# Patient Record
Sex: FEMALE | Race: ASIAN | Employment: FULL TIME | URBAN - METROPOLITAN AREA
[De-identification: names, ages, dates, MRNs, and addresses within clinical notes are randomized per-mention and may not be internally consistent; named-entity substitution may affect disease eponyms.]

---

## 2017-03-09 ENCOUNTER — OFFICE VISIT (OUTPATIENT)
Dept: INTERNAL MEDICINE CLINIC | Age: 42
End: 2017-03-09

## 2017-03-09 VITALS
TEMPERATURE: 98.7 F | HEART RATE: 72 BPM | RESPIRATION RATE: 18 BRPM | BODY MASS INDEX: 24.83 KG/M2 | SYSTOLIC BLOOD PRESSURE: 104 MMHG | HEIGHT: 65 IN | WEIGHT: 149 LBS | DIASTOLIC BLOOD PRESSURE: 78 MMHG

## 2017-03-09 DIAGNOSIS — B96.89 SINUSITIS, BACTERIAL: ICD-10-CM

## 2017-03-09 DIAGNOSIS — Z00.00 ANNUAL PHYSICAL EXAM: Primary | ICD-10-CM

## 2017-03-09 DIAGNOSIS — J32.9 SINUSITIS, BACTERIAL: ICD-10-CM

## 2017-03-09 PROCEDURE — 99213 OFFICE O/P EST LOW 20 MIN: CPT | Performed by: INTERNAL MEDICINE

## 2017-03-09 PROCEDURE — 99396 PREV VISIT EST AGE 40-64: CPT | Performed by: INTERNAL MEDICINE

## 2017-03-09 RX ORDER — LEVONORGESTREL AND ETHINYL ESTRADIOL 6-5-10
KIT ORAL
COMMUNITY
Start: 2017-01-20

## 2017-03-09 RX ORDER — AMOXICILLIN AND CLAVULANATE POTASSIUM 875; 125 MG/1; MG/1
1 TABLET, FILM COATED ORAL 2 TIMES DAILY
Qty: 20 TABLET | Refills: 0 | Status: SHIPPED | OUTPATIENT
Start: 2017-03-09 | End: 2017-03-19

## 2018-10-27 ENCOUNTER — HOSPITAL ENCOUNTER (OUTPATIENT)
Dept: MAMMOGRAPHY | Age: 43
Discharge: HOME | End: 2018-11-01
Payer: COMMERCIAL

## 2018-10-27 DIAGNOSIS — Z12.31 VISIT FOR SCREENING MAMMOGRAM: ICD-10-CM

## 2018-10-27 PROCEDURE — 77067 SCR MAMMO BI INCL CAD: CPT

## 2019-03-15 ENCOUNTER — OFFICE VISIT (OUTPATIENT)
Dept: FAMILY MEDICINE CLINIC | Age: 44
End: 2019-03-15
Payer: COMMERCIAL

## 2019-03-15 VITALS
OXYGEN SATURATION: 99 % | BODY MASS INDEX: 25.96 KG/M2 | RESPIRATION RATE: 16 BRPM | HEART RATE: 77 BPM | SYSTOLIC BLOOD PRESSURE: 100 MMHG | TEMPERATURE: 98.5 F | DIASTOLIC BLOOD PRESSURE: 76 MMHG | WEIGHT: 155.8 LBS | HEIGHT: 65 IN

## 2019-03-15 DIAGNOSIS — J01.90 ACUTE BACTERIAL SINUSITIS: Primary | ICD-10-CM

## 2019-03-15 DIAGNOSIS — B96.89 ACUTE BACTERIAL SINUSITIS: Primary | ICD-10-CM

## 2019-03-15 PROCEDURE — 99213 OFFICE O/P EST LOW 20 MIN: CPT | Performed by: NURSE PRACTITIONER

## 2019-03-15 RX ORDER — AMOXICILLIN AND CLAVULANATE POTASSIUM 875; 125 MG/1; MG/1
1 TABLET, FILM COATED ORAL 2 TIMES DAILY
Qty: 20 TABLET | Refills: 0 | Status: SHIPPED | OUTPATIENT
Start: 2019-03-15 | End: 2019-03-25

## 2019-03-15 RX ORDER — METHYLPREDNISOLONE 4 MG/1
TABLET ORAL
Qty: 1 KIT | Refills: 0 | Status: SHIPPED | OUTPATIENT
Start: 2019-03-15 | End: 2019-03-21

## 2019-03-15 ASSESSMENT — PATIENT HEALTH QUESTIONNAIRE - PHQ9
2. FEELING DOWN, DEPRESSED OR HOPELESS: 0
SUM OF ALL RESPONSES TO PHQ9 QUESTIONS 1 & 2: 0
SUM OF ALL RESPONSES TO PHQ QUESTIONS 1-9: 0
1. LITTLE INTEREST OR PLEASURE IN DOING THINGS: 0
SUM OF ALL RESPONSES TO PHQ QUESTIONS 1-9: 0

## 2019-03-15 ASSESSMENT — ENCOUNTER SYMPTOMS
SHORTNESS OF BREATH: 0
SINUS PRESSURE: 1
COUGH: 1
SINUS PAIN: 1
GASTROINTESTINAL NEGATIVE: 1
WHEEZING: 0
SORE THROAT: 1

## 2019-05-01 ENCOUNTER — TELEPHONE (OUTPATIENT)
Dept: INTERNAL MEDICINE CLINIC | Age: 44
End: 2019-05-01

## 2019-11-26 ENCOUNTER — TELEPHONE (OUTPATIENT)
Dept: INTERNAL MEDICINE CLINIC | Age: 44
End: 2019-11-26

## 2019-12-24 ENCOUNTER — TELEPHONE (OUTPATIENT)
Dept: INTERNAL MEDICINE CLINIC | Age: 44
End: 2019-12-24

## 2020-04-28 ENCOUNTER — VIRTUAL VISIT (OUTPATIENT)
Dept: INTERNAL MEDICINE CLINIC | Age: 45
End: 2020-04-28
Payer: COMMERCIAL

## 2020-04-28 VITALS — BODY MASS INDEX: 26.66 KG/M2 | HEIGHT: 65 IN | WEIGHT: 160 LBS

## 2020-04-28 PROCEDURE — 99203 OFFICE O/P NEW LOW 30 MIN: CPT | Performed by: INTERNAL MEDICINE

## 2020-04-28 ASSESSMENT — PATIENT HEALTH QUESTIONNAIRE - PHQ9
SUM OF ALL RESPONSES TO PHQ9 QUESTIONS 1 & 2: 0
1. LITTLE INTEREST OR PLEASURE IN DOING THINGS: 0
SUM OF ALL RESPONSES TO PHQ QUESTIONS 1-9: 0
2. FEELING DOWN, DEPRESSED OR HOPELESS: 0
SUM OF ALL RESPONSES TO PHQ QUESTIONS 1-9: 0

## 2020-06-01 ENCOUNTER — OFFICE VISIT (OUTPATIENT)
Dept: INTERNAL MEDICINE CLINIC | Age: 45
End: 2020-06-01
Payer: COMMERCIAL

## 2020-06-01 VITALS
HEIGHT: 65 IN | SYSTOLIC BLOOD PRESSURE: 108 MMHG | TEMPERATURE: 98.3 F | HEART RATE: 76 BPM | OXYGEN SATURATION: 98 % | DIASTOLIC BLOOD PRESSURE: 74 MMHG | WEIGHT: 164 LBS | BODY MASS INDEX: 27.32 KG/M2

## 2020-06-01 LAB
A/G RATIO: 1.8 (ref 1.1–2.2)
ALBUMIN SERPL-MCNC: 4.5 G/DL (ref 3.4–5)
ALP BLD-CCNC: 53 U/L (ref 40–129)
ALT SERPL-CCNC: 29 U/L (ref 10–40)
ANION GAP SERPL CALCULATED.3IONS-SCNC: 15 MMOL/L (ref 3–16)
AST SERPL-CCNC: 26 U/L (ref 15–37)
BASOPHILS ABSOLUTE: 0.1 K/UL (ref 0–0.2)
BASOPHILS RELATIVE PERCENT: 0.9 %
BILIRUB SERPL-MCNC: 0.6 MG/DL (ref 0–1)
BUN BLDV-MCNC: 11 MG/DL (ref 7–20)
CALCIUM SERPL-MCNC: 9.1 MG/DL (ref 8.3–10.6)
CHLORIDE BLD-SCNC: 96 MMOL/L (ref 99–110)
CHOLESTEROL, TOTAL: 164 MG/DL (ref 0–199)
CO2: 23 MMOL/L (ref 21–32)
CREAT SERPL-MCNC: 0.7 MG/DL (ref 0.6–1.1)
EOSINOPHILS ABSOLUTE: 0.3 K/UL (ref 0–0.6)
EOSINOPHILS RELATIVE PERCENT: 5.3 %
GFR AFRICAN AMERICAN: >60
GFR NON-AFRICAN AMERICAN: >60
GLOBULIN: 2.5 G/DL
GLUCOSE BLD-MCNC: 108 MG/DL (ref 70–99)
HCT VFR BLD CALC: 43 % (ref 36–48)
HDLC SERPL-MCNC: 41 MG/DL (ref 40–60)
HEMOGLOBIN: 14.6 G/DL (ref 12–16)
LDL CHOLESTEROL CALCULATED: 86 MG/DL
LYMPHOCYTES ABSOLUTE: 2.1 K/UL (ref 1–5.1)
LYMPHOCYTES RELATIVE PERCENT: 33.6 %
MCH RBC QN AUTO: 31.1 PG (ref 26–34)
MCHC RBC AUTO-ENTMCNC: 34 G/DL (ref 31–36)
MCV RBC AUTO: 91.4 FL (ref 80–100)
MONOCYTES ABSOLUTE: 0.4 K/UL (ref 0–1.3)
MONOCYTES RELATIVE PERCENT: 6.2 %
NEUTROPHILS ABSOLUTE: 3.3 K/UL (ref 1.7–7.7)
NEUTROPHILS RELATIVE PERCENT: 54 %
PDW BLD-RTO: 12.9 % (ref 12.4–15.4)
PLATELET # BLD: 326 K/UL (ref 135–450)
PMV BLD AUTO: 6.7 FL (ref 5–10.5)
POTASSIUM SERPL-SCNC: 4.4 MMOL/L (ref 3.5–5.1)
RBC # BLD: 4.71 M/UL (ref 4–5.2)
SODIUM BLD-SCNC: 134 MMOL/L (ref 136–145)
TOTAL PROTEIN: 7 G/DL (ref 6.4–8.2)
TRIGL SERPL-MCNC: 186 MG/DL (ref 0–150)
VLDLC SERPL CALC-MCNC: 37 MG/DL
WBC # BLD: 6.1 K/UL (ref 4–11)

## 2020-06-01 PROCEDURE — 90471 IMMUNIZATION ADMIN: CPT | Performed by: INTERNAL MEDICINE

## 2020-06-01 PROCEDURE — 99396 PREV VISIT EST AGE 40-64: CPT | Performed by: INTERNAL MEDICINE

## 2020-06-01 PROCEDURE — 90715 TDAP VACCINE 7 YRS/> IM: CPT | Performed by: INTERNAL MEDICINE

## 2020-06-01 PROCEDURE — 36415 COLL VENOUS BLD VENIPUNCTURE: CPT | Performed by: INTERNAL MEDICINE

## 2020-07-07 ENCOUNTER — HOSPITAL ENCOUNTER (OUTPATIENT)
Dept: PHYSICAL THERAPY | Age: 45
Setting detail: THERAPIES SERIES
Discharge: HOME | End: 2020-07-07
Payer: COMMERCIAL

## 2020-07-07 PROCEDURE — 97112 NEUROMUSCULAR REEDUCATION: CPT

## 2020-07-07 PROCEDURE — 97161 PT EVAL LOW COMPLEX 20 MIN: CPT

## 2020-07-07 PROCEDURE — 97110 THERAPEUTIC EXERCISES: CPT

## 2020-07-07 NOTE — FLOWSHEET NOTE
[address] [facility], [address] [place] [address], [address] [place] [address] [address]  Phone: [phone]   Fax:     [phone]      Physical Therapy Treatment Note/ Progress Report:           Date:  2020    Patient Name:  Steph Rodriguez    :  1975  MRN: [id number]  Restrictions/Precautions:    Medical/Treatment Diagnosis Information:  · Diagnosis: U70.487Y (ICD-10-CM) - Sprain of right ankle, unspecified ligament, sequela  · Treatment Diagnosis: Right ankle sprain,   Insurance/Certification information:  PT Insurance Information: [facility]  Physician Information:  Referring Practitioner: Marquis Robbins MD  Has the plan of care been signed (Y/N):        []  Yes  []  No     Date of Patient follow up with Physician:     Assessment Summary: Glenis Espinosa is a 54 y.o. female reporting to OP PT with c/c of right ankle pain which has been occurring since early March when falling down steps which caused inversion sprain. Pt is noted to have relatively good ROM and strength. There is no laxity noted at ankle but she does have some pain at ATFL when performing an anterior drawer test. Likely ATFL sprain and peroneal tendon strain.        Is this a Progress Report:     []  Yes  [x]  No        If Yes:  Date Range for reporting period:  Beginning 20  Ending     Progress report will be due (10 Rx or 30 days whichever is less): 82       Recertification will be due (POC Duration  / 90 days whichever is less): 20         Visit # Insurance Allowable Auth Required   1 20 May need auth [x]  Yes []  No        Functional Scale: LEFS 18%    Date assessed:        Latex Allergy:  [x]NO      []YES  Preferred Language for Healthcare:   [x]English       []other:      Pain level:  0-4/10     SUBJECTIVE:  see eval    OBJECTIVE: see eval      RESTRICTIONS/PRECAUTIONS: Avoid end range inversion and plantarflexion to avoid repeated ATFL and peroneal stress    Exercises/Interventions: hip and LE for self-care, mobility, lifting, and ambulation/stair navigation      Manual Treatments:  PROM / STM / Oscillations-Mobs:  G-I, II, III, IV (PA's, Inf., Post.)  [] (45689) Provided manual therapy to mobilize LE, proximal hip and/or LS spine soft tissue/joints for the purpose of modulating pain, promoting relaxation, increasing ROM, reducing/eliminating soft tissue swelling/inflammation/restriction, improving soft tissue extensibility and allowing for proper ROM for normal function with self-care, mobility, lifting and ambulation. Modalities:     [] GAME READY (VASO)- for significant edema, swelling, pain control. Charges:  Timed Code Treatment Minutes: 23   Total Treatment Minutes: 45      [x] EVAL (LOW) 71381 (typically 20 minutes face-to-face)  [] EVAL (MOD) 62064 (typically 30 minutes face-to-face)  [] EVAL (HIGH) 31206 (typically 45 minutes face-to-face)  [] RE-EVAL     [x] SB(68083) x     [] IONTO  [x] NMR (91486) x     [] VASO  [] Manual (69376) x     [] Other:  [] TA x      [] Mech Traction (33812)  [] ES(attended) (57054)      [] ES (un) (43027):    GOALS:    Patient stated goal: strengthen ankle, no pain after exercises.      Therapist goals for Patient:   Short Term Goals: To be achieved in: 2 weeks  1. Independent in HEP and progression per patient tolerance, in order to prevent re-injury. []? Progressing: []? Met: []? Not Met: []? Adjusted      2. Patient will have a decrease in pain to facilitate improvement in movement, function, and ADLs as indicated by Functional Deficits. []? Progressing: []? Met: []? Not Met: []? Adjusted      Long Term Goals: To be achieved in: 6 weeks  1. Disability index score of 0% or less for the LEFS to assist with reaching prior level of function. []? Progressing: []? Met: []? Not Met: []? Adjusted      2. Patient will demonstrate increased AROM to 12-50 to allow for proper joint functioning as indicated by patients Functional Deficits.    []? Progressing: []? Met: []? Not Met: []? Adjusted      3. Patient will be able to resume Roman classes. []? Progressing: []? Met: []? Not Met: []? Adjusted      4. Patient will return to functional activities without increased symptoms or restriction. []? Progressing: []? Met: []? Not Met: []? Adjusted      5. Pt will be able to run with dog without symptoms. (patient specific functional goal)    []? Progressing: []? Met: []? Not Met: []? Adjusted               ASSESSMENT:  See eval    Patient received education on their current pathology and how their condition effects them with their functional activities. Patient understood discussion and questions were answered. Patient understands their activity limitations and understands rational for treatment progression. Pt educated on plan of care and HEP, if worsening symptoms to d/c that exercise. Overall Progression Towards Functional goals/ Treatment Progress Update:  [] Patient is progressing as expected towards functional goals listed. [] Progression is slowed due to complexities/Impairments listed. [] Progression has been slowed due to co-morbidities.   [x] Plan just implemented, too soon to assess goals progression <30days   [] Goals require adjustment due to lack of progress  [] Patient is not progressing as expected and requires additional follow up with physician  [] Other    Prognosis for POC: [x] Good [] Fair  [] Poor      Patient requires continued skilled intervention: [x] Yes  [] No    Treatment/Activity Tolerance:  [x] Patient able to complete treatment  [] Patient limited by fatigue  [] Patient limited by pain    [] Patient limited by other medical complications  [] Other:         Return to Play: (if applicable)   []  Stage 1: Intro to Strength   []  Stage 2: Return to Run and Strength   []  Stage 3: Return to Jump and Strength   []  Stage 4: Dynamic Strength and Agility   []  Stage 5: Sport Specific Training     []  Ready to Return to Play,

## 2020-07-07 NOTE — PLAN OF CARE
[]Constant   [x]Intermittent  []Radiating []Localized []other:     Numbness/Tingling: None    Occupation/School: , teacher    Living Status/Prior Level of Function: Independent with ADLs and IADLs. OBJECTIVE:     ROM RIGHT LEFT        Ankle Plantarflexion 40 50   Ankle Dorsiflexion 8 12   Ankle Inversion 30 30   Ankle Eversion 28 20        Popliteal angle     Rectus femoris          Strength  RIGHT LEFT             Ankle DF 5    Ankle PF 4+    Ankle Inv 5    Ankle EV 5           Reflexes/Sensation:    [x]Dermatomes/Myotomes intact    [x]Reflexes equal and normal bilaterally   []Other:    Joint mobility:    []Normal    [x]Hypo   []Hyper    Palpation: ttp ATFL, peroneal tendons; there no is navicular or base of the 5th tenderness. Is is no edema. Functional Mobility/Transfers: Independent    Posture: WFL    Bandages/Dressings/Incisions: None    Gait: (include devices/WB status) WNL    Orthopedic Special Tests: painful anterior drawer- no laxity noted compared to contralateral side. [x] Patient history, allergies, meds reviewed. Medical chart reviewed. See intake form. Review Of Systems (ROS):  [x]Performed Review of systems (Integumentary, CardioPulmonary, Neurological) by intake and observation. Intake form has been scanned into medical record. Patient has been instructed to contact their primary care physician regarding ROS issues if not already being addressed at this time.       Co-morbidities/Complexities (which will affect course of rehabilitation):   [x]None           Arthritic conditions   []Rheumatoid arthritis (M05.9)  []Osteoarthritis (M19.91)   Cardiovascular conditions   []Hypertension (I10)  []Hyperlipidemia (E78.5)  []Angina pectoris (I20)  []Atherosclerosis (I70)   Musculoskeletal conditions   []Disc pathology   []Congenital spine pathologies   []Prior surgical intervention  []Osteoporosis (M81.8)  []Osteopenia (M85.8)   Endocrine conditions standardized tests and measures addressing any of the following: body structures and functions (impairments), activity limitations, and/or participation restrictions;:  [x] a total of 1-2 or more elements   [] a total of 3 or more elements   [] a total of 4 or more elements   [x] A clinical presentation with:  [x] stable and/or uncomplicated characteristics   [] evolving clinical presentation with changing characteristics  [] unstable and unpredictable characteristics;   [x] Clinical decision making of [] low, [] moderate, [] high complexity using standardized patient assessment instrument and/or measurable assessment of functional outcome. [x] EVAL (LOW) 15051 (typically 20 minutes face-to-face)  [] EVAL (MOD) 85986 (typically 30 minutes face-to-face)  [] EVAL (HIGH) 04920 (typically 45 minutes face-to-face)  [] RE-EVAL     PLAN:   Frequency/Duration:  2 days per week for 6 Weeks:  Interventions:  [x]  Therapeutic exercise including: strength training, ROM, for Lower extremity and core   [x]  NMR activation and proprioception for LE, Glutes and Core   [x]  Manual therapy as indicated for LE, Hip and spine to include: Dry Needling/IASTM, STM, PROM, Gr I-IV mobilizations, manipulation. [x] Modalities as needed that may include: thermal agents, E-stim, Biofeedback, US, iontophoresis as indicated  [x] Patient education on joint protection, postural re-education, activity modification, progression of HEP. HEP instruction: (see scanned forms)    GOALS:  Patient stated goal: strengthen ankle, no pain after exercises. Therapist goals for Patient:   Short Term Goals: To be achieved in: 2 weeks  1. Independent in HEP and progression per patient tolerance, in order to prevent re-injury. [] Progressing: [] Met: [] Not Met: [] Adjusted     2. Patient will have a decrease in pain to facilitate improvement in movement, function, and ADLs as indicated by Functional Deficits.   [] Progressing: [] Met: [] Not Met: [] Adjusted     Long Term Goals: To be achieved in: 6 weeks  1. Disability index score of 0% or less for the LEFS to assist with reaching prior level of function. [] Progressing: [] Met: [] Not Met: [] Adjusted     2. Patient will demonstrate increased AROM to 12-50 to allow for proper joint functioning as indicated by patients Functional Deficits. [] Progressing: [] Met: [] Not Met: [] Adjusted     3. Patient will be able to resume Roman classes. [] Progressing: [] Met: [] Not Met: [] Adjusted     4. Patient will return to functional activities without increased symptoms or restriction. [] Progressing: [] Met: [] Not Met: [] Adjusted     5.  Pt will be able to run with dog without symptoms. (patient specific functional goal)    [] Progressing: [] Met: [] Not Met: [] Adjusted      Electronically signed by:  Barbara Ryan, PT

## 2020-07-14 ENCOUNTER — HOSPITAL ENCOUNTER (OUTPATIENT)
Dept: PHYSICAL THERAPY | Age: 45
Setting detail: THERAPIES SERIES
Discharge: HOME | End: 2020-07-14
Payer: COMMERCIAL

## 2020-07-14 PROCEDURE — 97112 NEUROMUSCULAR REEDUCATION: CPT

## 2020-07-14 PROCEDURE — 97110 THERAPEUTIC EXERCISES: CPT

## 2020-07-14 NOTE — FLOWSHEET NOTE
[address] [facility], [address] [place] [address], [address] [place] [address] [address]  Phone: [phone]   Fax:     [phone]      Physical Therapy Treatment Note/ Progress Report:           Date:  2020    Patient Name:  Surya Monae    :  1975  MRN: [id number]  Restrictions/Precautions:    Medical/Treatment Diagnosis Information:  · Diagnosis: J09.740M (ICD-10-CM) - Sprain of right ankle, unspecified ligament, sequela  · Treatment Diagnosis: Right ankle sprain,   Insurance/Certification information:  PT Insurance Information: [facility]  Physician Information:  Referring Practitioner: Kathrene Mohs, MD  Has the plan of care been signed (Y/N):        []  Yes  []  No     Date of Patient follow up with Physician:     Assessment Summary: Gina Germain is a 54 y.o. female reporting to OP PT with c/c of right ankle pain which has been occurring since early March when falling down steps which caused inversion sprain. Pt is noted to have relatively good ROM and strength. There is no laxity noted at ankle but she does have some pain at ATFL when performing an anterior drawer test. Likely ATFL sprain and peroneal tendon strain. Is this a Progress Report:     []  Yes  [x]  No        If Yes:  Date Range for reporting period:  Beginning 20  Ending     Progress report will be due (10 Rx or 30 days whichever is less): 0       Recertification will be due (POC Duration  / 90 days whichever is less): 20         Visit # Insurance Allowable Auth Required   2 20 May need auth [x]  Yes []  No        Functional Scale: LEFS 18%    Date assessed:        Latex Allergy:  [x]NO      []YES  Preferred Language for Healthcare:   [x]English       []other:      Pain level:  0/10     SUBJECTIVE:  Pt states ankle gets a little stiff after exercises.      OBJECTIVE:   Cavitation noted with eccentric eversion using band      RESTRICTIONS/PRECAUTIONS: Avoid end range inversion and plantarflexion to avoid repeated ATFL and peroneal stress    Exercises/Interventions:   Therapeutic Ex (91490) HEP 7/7/20 7/14/20    Warm-up       Rec bike   5', Lv 4           TABLE       Eversion/DF w/ TB x 10x3 ea BTB     Eccentric eversion   10x3 BTB                         SEATED       Eversion towel slides x x20 --                                STANDING       DFon 1/2 roll x x20 10x3    PF on 1/2 roll x x20 10x3    Tandem standing x 1' ea --    Lateral step up   X20, 4\"    Lateral step w/ resistances x X20, LTB --    BOSU balance   30\"x3    Leg press DL   10x3, 100#    Wedge gastroc stretch   30\"x2    Anterior step downs                                                          Manual (65421): Posterior talar glides, calcaneal distraction 5'    Therapeutic Exercise and NMR EXR  [x] (14048) Provided verbal/tactile cueing for activities related to strengthening, flexibility, endurance, ROM for improvements in LE, proximal hip, and core control with self care, mobility, lifting, ambulation. [x] (55943) Provided verbal/tactile cueing for activities related to improving balance, coordination, kinesthetic sense, posture, motor skill, proprioception to assist with LE, proximal hip, and core control in self-care, mobility, lifting, ambulation and eccentric single leg control.      NMR and Therapeutic Activities:    [x] (85598 or 37791) Provided verbal/tactile cueing for activities related to improving balance, coordination, kinesthetic sense, posture, motor skill, proprioception and motor activation to allow for proper function of core, proximal hip and LE with self-care and ADLs and functional mobility.   [] (99614) Gait Re-education- Provided training and instruction to the patient for proper LE, core and proximal hip recruitment and positioning and eccentric body weight control with ambulation re-education including up and down stairs     Home Exercise Program:    [x] (89079) Reviewed/Progressed HEP activities related to strengthening, flexibility, endurance, ROM of core, proximal hip and LE for functional self-care, mobility, lifting and ambulation/stair navigation   [] (13103) Reviewed/Progressed HEP activities related to improving balance, coordination, kinesthetic sense, posture, motor skill, proprioception of core, proximal hip and LE for self-care, mobility, lifting, and ambulation/stair navigation      Manual Treatments:  PROM / STM / Oscillations-Mobs:  G-I, II, III, IV (PA's, Inf., Post.)  [] (75466) Provided manual therapy to mobilize LE, proximal hip and/or LS spine soft tissue/joints for the purpose of modulating pain, promoting relaxation, increasing ROM, reducing/eliminating soft tissue swelling/inflammation/restriction, improving soft tissue extensibility and allowing for proper ROM for normal function with self-care, mobility, lifting and ambulation. Modalities:     [] GAME READY (VASO)- for significant edema, swelling, pain control. CP to ankle end of session 10'- no adverse skin reaction. Charges:  Timed Code Treatment Minutes: 40   Total Treatment Minutes: 50      [] EVAL (LOW) 76700 (typically 20 minutes face-to-face)  [] EVAL (MOD) 78445 (typically 30 minutes face-to-face)  [] EVAL (HIGH) 07231 (typically 45 minutes face-to-face)  [] RE-EVAL     [x] XR(54688) 2    [] IONTO  [x] NMR (01194) 1     [] VASO  [] Manual (24920) x     [] Other:  [] TA x      [] Mech Traction (16297)  [] ES(attended) (12471)      [] ES (un) (59745):    GOALS:    Patient stated goal: strengthen ankle, no pain after exercises.      Therapist goals for Patient:   Short Term Goals: To be achieved in: 2 weeks  1. Independent in HEP and progression per patient tolerance, in order to prevent re-injury. []? Progressing: []? Met: []? Not Met: []? Adjusted      2. Patient will have a decrease in pain to facilitate improvement in movement, function, and ADLs as indicated by Functional Deficits. []? Progressing: []?  Met: []? Not Met: []? Adjusted      Long Term Goals: To be achieved in: 6 weeks  1. Disability index score of 0% or less for the LEFS to assist with reaching prior level of function. []? Progressing: []? Met: []? Not Met: []? Adjusted      2. Patient will demonstrate increased AROM to 12-50 to allow for proper joint functioning as indicated by patients Functional Deficits. []? Progressing: []? Met: []? Not Met: []? Adjusted      3. Patient will be able to resume Roman classes. []? Progressing: []? Met: []? Not Met: []? Adjusted      4. Patient will return to functional activities without increased symptoms or restriction. []? Progressing: []? Met: []? Not Met: []? Adjusted      5. Pt will be able to run with dog without symptoms. (patient specific functional goal)    []? Progressing: []? Met: []? Not Met: []? Adjusted               ASSESSMENT:  Pt [place] session well. Appears to be managing HEP with some discomfort. No gross instability of ankle noted with exercises today. Verbal and tactile cues to help facilitate more controll eccentric eversion motion. Overall Progression Towards Functional goals/ Treatment Progress Update:  [] Patient is progressing as expected towards functional goals listed. [] Progression is slowed due to complexities/Impairments listed. [] Progression has been slowed due to co-morbidities.   [x] Plan just implemented, too soon to assess goals progression <30days   [] Goals require adjustment due to lack of progress  [] Patient is not progressing as expected and requires additional follow up with physician  [] Other    Prognosis for POC: [x] Good [] Fair  [] Poor      Patient requires continued skilled intervention: [x] Yes  [] No    Treatment/Activity Tolerance:  [x] Patient able to complete treatment  [] Patient limited by fatigue  [] Patient limited by pain    [] Patient limited by other medical complications  [] Other:         Return to Play: (if applicable)   []  Stage 1: Intro to Strength   []  Stage 2: Return to Run and Strength   []  Stage 3: Return to Jump and Strength   []  Stage 4: Dynamic Strength and Agility   []  Stage 5: Sport Specific Training     []  Ready to Return to Play, Meets All Above Stages   []  Not Ready for Return to Sports   Comments:                               PLAN: See eval  [x] Continue per plan of care [] Alter current plan (see comments above)  [] Plan of care initiated [] Hold pending MD visit [] Discharge      Electronically signed by:  Darius Casanova PT    Note: If patient does not return for scheduled/ recommended follow up visits, this note will serve as a discharge from care along with most recent update on progress.

## 2022-07-06 ENCOUNTER — OFFICE VISIT (OUTPATIENT)
Dept: INTERNAL MEDICINE CLINIC | Age: 47
End: 2022-07-06
Payer: COMMERCIAL

## 2022-07-06 VITALS
DIASTOLIC BLOOD PRESSURE: 70 MMHG | HEIGHT: 65 IN | BODY MASS INDEX: 26.79 KG/M2 | OXYGEN SATURATION: 98 % | WEIGHT: 160.8 LBS | HEART RATE: 88 BPM | SYSTOLIC BLOOD PRESSURE: 102 MMHG

## 2022-07-06 DIAGNOSIS — J30.89 ENVIRONMENTAL AND SEASONAL ALLERGIES: ICD-10-CM

## 2022-07-06 DIAGNOSIS — Z00.00 ENCOUNTER FOR WELL ADULT EXAM WITHOUT ABNORMAL FINDINGS: Primary | ICD-10-CM

## 2022-07-06 DIAGNOSIS — Z12.12 SCREENING FOR COLORECTAL CANCER: ICD-10-CM

## 2022-07-06 DIAGNOSIS — Z12.11 SCREENING FOR COLORECTAL CANCER: ICD-10-CM

## 2022-07-06 PROCEDURE — 36415 COLL VENOUS BLD VENIPUNCTURE: CPT | Performed by: INTERNAL MEDICINE

## 2022-07-06 PROCEDURE — 99396 PREV VISIT EST AGE 40-64: CPT | Performed by: INTERNAL MEDICINE

## 2022-07-06 PROCEDURE — 99212 OFFICE O/P EST SF 10 MIN: CPT | Performed by: INTERNAL MEDICINE

## 2022-07-06 RX ORDER — MULTIVITAMIN
TABLET ORAL
Qty: 1 TABLET | Refills: 0 | COMMUNITY
Start: 2022-07-06

## 2022-07-06 RX ORDER — LEVOCETIRIZINE DIHYDROCHLORIDE 5 MG/1
5 TABLET, FILM COATED ORAL NIGHTLY
Qty: 90 TABLET | Refills: 3 | Status: SHIPPED | OUTPATIENT
Start: 2022-07-06

## 2022-07-06 SDOH — ECONOMIC STABILITY: FOOD INSECURITY: WITHIN THE PAST 12 MONTHS, THE FOOD YOU BOUGHT JUST DIDN'T LAST AND YOU DIDN'T HAVE MONEY TO GET MORE.: NEVER TRUE

## 2022-07-06 SDOH — ECONOMIC STABILITY: FOOD INSECURITY: WITHIN THE PAST 12 MONTHS, YOU WORRIED THAT YOUR FOOD WOULD RUN OUT BEFORE YOU GOT MONEY TO BUY MORE.: NEVER TRUE

## 2022-07-06 ASSESSMENT — PATIENT HEALTH QUESTIONNAIRE - PHQ9
2. FEELING DOWN, DEPRESSED OR HOPELESS: 0
SUM OF ALL RESPONSES TO PHQ QUESTIONS 1-9: 0
1. LITTLE INTEREST OR PLEASURE IN DOING THINGS: 0
SUM OF ALL RESPONSES TO PHQ QUESTIONS 1-9: 0
SUM OF ALL RESPONSES TO PHQ QUESTIONS 1-9: 0
SUM OF ALL RESPONSES TO PHQ9 QUESTIONS 1 & 2: 0
SUM OF ALL RESPONSES TO PHQ QUESTIONS 1-9: 0

## 2022-07-06 ASSESSMENT — SOCIAL DETERMINANTS OF HEALTH (SDOH): HOW HARD IS IT FOR YOU TO PAY FOR THE VERY BASICS LIKE FOOD, HOUSING, MEDICAL CARE, AND HEATING?: NOT HARD AT ALL

## 2022-07-06 NOTE — PATIENT INSTRUCTIONS
with your doctor. Treatment can help.  Talk to your doctor about whether you have any risk factors for sexually transmitted infections (STIs). You can help prevent STIs if you wait to have sex with a new partner (or partners) until you've each been tested for STIs. It also helps if you use condoms (male or female condoms) and if you limit your sex partners to one person who only has sex with you. Vaccines are available for some STIs, such as HPV.  Use birth control if it's important to you to prevent pregnancy. Talk with your doctor about the choices available and what might be best for you.  If you think you may have a problem with alcohol or drug use, talk to your doctor. This includes prescription medicines (such as amphetamines and opioids) and illegal drugs (such as cocaine and methamphetamine). Your doctor can help you figure out what type of treatment is best for you.  Protect your skin from too much sun. When you're outdoors from 10 a.m. to 4 p.m., stay in the shade or cover up with clothing and a hat with a wide brim. Wear sunglasses that block UV rays. Even when it's cloudy, put broad-spectrum sunscreen (SPF 30 or higher) on any exposed skin.  See a dentist one or two times a year for checkups and to have your teeth cleaned.  Wear a seat belt in the car. When should you call for help? Watch closely for changes in your health, and be sure to contact your doctor if you have any problems or symptoms that concern you. Where can you learn more? Go to [email or web address]://[email or web address].[email or web address]. org and sign in to your [employer] account. Enter P072 in the [facility] box to learn more about \"Well Visit, Ages 25 to 48: Care Instructions. \"     If you do not have an account, please click on the \"Sign Up Now\" link. Current as of: October 6, 2021               Content Version: 13.3  © 7910-7702 [employer]. Care instructions adapted under license by [facility] [place] ([facility]).  If you have questions about a medical condition or this instruction, always ask your healthcare professional. [place] [address] any warranty or liability for your use of this information.

## 2022-07-06 NOTE — PROGRESS NOTES
[facility] Primary Care  History and Physical  Shazia Walker M.D. Lois Funk  YOB: 1975    Date of Service:  7/6/2022    Chief Complaint:   Lois Funk is a 49 y.o. female who presents for   Chief Complaint   Patient presents with    Annual Exam     fasting    Allergies       Assessment/Plan:    Jamin Arango was seen today for annual exam.    Diagnoses and all orders for this visit:    Encounter for well adult exam without abnormal findings  -     Lipid Panel  -     CBC with Auto Differential  -     Comprehensive Metabolic Panel    Screening for colorectal cancer  -     FIT-DNA (Cologuard)    Environmental and seasonal allergies  Start  levocetirizine (XYZAL) 5 MG tablet; Take 1 tablet by mouth nightly      Return in 1 year (on 7/6/2023). HPI: Here for Annual Physical and Follow up. She complain of persistent allergy symptoms despite taking over the counter allegra. She would like a prescription medication.     No results found for: LABA1C, LABMICR  Lab Results   Component Value Date     (L) 06/01/2020    K 4.4 06/01/2020    CL 96 (L) 06/01/2020    CO2 23 06/01/2020    BUN 11 06/01/2020    CREATININE 0.7 06/01/2020    GLUCOSE 108 (H) 06/01/2020    CALCIUM 9.1 06/01/2020     Lab Results   Component Value Date/Time    CHOL 164 06/01/2020 10:16 AM    TRIG 186 06/01/2020 10:16 AM    HDL 41 06/01/2020 10:16 AM    LDLCALC 86 06/01/2020 10:16 AM     Lab Results   Component Value Date    ALT 29 06/01/2020    AST 26 06/01/2020     No results found for: TSH, T4FREE, T3FREE  Lab Results   Component Value Date    WBC 6.1 06/01/2020    HGB 14.6 06/01/2020    HCT 43.0 06/01/2020    MCV 91.4 06/01/2020     06/01/2020     No results found for: INR   No results found for: PSA   No results found for: LABURIC     Wt Readings from Last 3 Encounters:   07/06/22 160 lb 12.8 oz (72.9 kg)   06/01/20 164 lb (74.4 kg)   04/28/20 160 lb (72.6 kg)     BP Readings from Last 3 Encounters: 22 102/70   20 108/74   03/15/19 100/76       Patient Active Problem List   Diagnosis   (none) - all problems resolved or deleted       No Known Allergies  Outpatient Medications Marked as Taking for the 22 encounter (Office Visit) with Lori Spatz, MD   Medication Sig Dispense Refill    [address], 29, per tablet          Past Medical History:   Diagnosis Date    Gestational diabetes      Past Surgical History:   Procedure Laterality Date     SECTION  2007, 2009, 2014    DILATION AND CURETTAGE OF UTERUS  , 2012     Family History   Problem Relation Age of Onset    High Blood Pressure Father     High Cholesterol Father     Heart Disease Father     Dementia Father     Diabetes Maternal Grandmother     Stroke Maternal Grandmother     Depression Mother     Anxiety Disorder Mother     Diabetes Mother     High Blood Pressure Brother     Heart Disease Brother 27        stents    Cancer Paternal Aunt         breast    Heart Disease Paternal Uncle     Cancer Paternal Uncle         leukemia    Depression Brother     Anxiety Disorder Brother     Migraines Brother      Social History     Socioeconomic History    Marital status:      Spouse name: Not on file    Number of children: Not on file    Years of education: Not on file    Highest education level: Not on file   Occupational History    Occupation: teacher   Tobacco Use    Smoking status: Never Smoker    Smokeless tobacco: Never Used    Tobacco comment:  In college- smoked x 1 month   Substance and Sexual Activity    Alcohol use: Yes     Comment: socially    Drug use: No    Sexual activity: Yes     Partners: Male   Other Topics Concern    Not on file   Social History Narrative    Not on file     Social Determinants of Health     Financial Resource Strain: Low Risk     Difficulty of Paying Living Expenses: Not hard at all   Food Insecurity: No Food Insecurity    Worried About Running Out of Food in the Last Year: Never true    [address] in the Last Year: Never true   Transportation Needs:     Lack of Transportation (Medical): Not on file    Lack of Transportation (Non-Medical): Not on file   Physical Activity:     Days of Exercise per Week: Not on file    Minutes of Exercise per Session: Not on file   Stress:     Feeling of Stress : Not on file   Social Connections:     Frequency of Communication with Friends and Family: Not on file    Frequency of Social Gatherings with Friends and Family: Not on file    Attends Islam Services: Not on file    Active Member of [address] [place] [address] or Organizations: Not on file    Attends Club or Organization Meetings: Not on file    Marital Status: Not on file   Intimate Partner Violence:     Fear of Current or Ex-Partner: Not on file    Emotionally Abused: Not on file    Physically Abused: Not on file    Sexually Abused: Not on file   Housing Stability:     Unable to Pay for Housing in the Last Year: Not on file    Number of [place] in the Last Year: Not on file    Unstable Housing in the Last Year: Not on file       Review of Systems:  A comprehensive review of systems was negative except for what was noted in the HPI. Physical Exam:   Vitals:    07/06/22 1025   BP: 102/70   Pulse: 88   SpO2: 98%   Weight: 160 lb 12.8 oz (72.9 kg)   Height: 5' 5\" (1.651 m)     Body mass index is 26.76 kg/m². Constitutional: She is oriented to person, place, and time. She appears well-developed and well-nourished. No distress. HEENT:   Head: Normocephalic and atraumatic. Right Ear: Tympanic membrane, external ear and ear canal normal.   Left Ear: Tympanic membrane, external ear and ear canal normal.   Mouth/Throat: Oropharynx is clear and moist, and mucous membranes are normal.  There is no cervical adenopathy. Eyes: Conjunctivae and extraocular motions are normal. Pupils are equal, round, and reactive to light. Neck: Supple. No JVD present.  Carotid bruit is not present. No mass and no thyromegaly present. Cardiovascular: Normal rate, regular rhythm, normal heart sounds and intact distal pulses. Pulmonary/Chest: Effort normal and breath sounds normal. No respiratory distress. She has no wheezes, rhonchi or rales. Abdominal: Soft, non-tender. Bowel sounds and aorta are normal. She exhibits no organomegaly, mass or bruit. Musculoskeletal: Normal range of motion, no synovitis. She exhibits no edema. Neurological: She is alert and oriented to person, place, and time. She has normal reflexes. No cranial nerve deficit. Coordination normal.   Skin: Skin is warm and dry. There is no rash or erythema. No suspicious lesions noted.        Preventive Care:  Health Maintenance   Topic Date Due    Depression Screen  Never done    Diabetes screen  Never done    Colorectal Cancer Screen  Never done    Flu vaccine (1) 09/01/2022    Cervical cancer screen  01/01/2023    Lipids  06/01/2025    DTaP/Tdap/Td vaccine (3 - Td or Tdap) 06/01/2030    COVID-19 Vaccine  Completed    HIV screen  Completed    Hepatitis A vaccine  Aged Out    Hepatitis B vaccine  Aged Out    Hib vaccine  Aged Out    Meningococcal (ACWY) vaccine  Aged Out    Pneumococcal 0-64 years Vaccine  Aged Out    Hepatitis C screen  Discontinued        Recommendations for Preventive Services Due: see orders and patient instructions/AVS.

## 2022-07-07 DIAGNOSIS — Z00.00 ENCOUNTER FOR WELL ADULT EXAM WITHOUT ABNORMAL FINDINGS: ICD-10-CM

## 2022-07-07 DIAGNOSIS — Z00.00 ENCOUNTER FOR WELL ADULT EXAM WITHOUT ABNORMAL FINDINGS: Primary | ICD-10-CM

## 2022-07-07 LAB
A/G RATIO: 1.6 (ref 1.1–2.2)
ALBUMIN SERPL-MCNC: 4.6 G/DL (ref 3.4–5)
ALP BLD-CCNC: 47 U/L (ref 40–129)
ALT SERPL-CCNC: 32 U/L (ref 10–40)
ANION GAP SERPL CALCULATED.3IONS-SCNC: 16 MMOL/L (ref 3–16)
AST SERPL-CCNC: 26 U/L (ref 15–37)
BASOPHILS ABSOLUTE: 0 K/UL (ref 0–0.2)
BASOPHILS RELATIVE PERCENT: 0.7 %
BILIRUB SERPL-MCNC: 0.6 MG/DL (ref 0–1)
BUN BLDV-MCNC: 10 MG/DL (ref 7–20)
CALCIUM SERPL-MCNC: 9.6 MG/DL (ref 8.3–10.6)
CHLORIDE BLD-SCNC: 101 MMOL/L (ref 99–110)
CHOLESTEROL, TOTAL: 185 MG/DL (ref 0–199)
CO2: 20 MMOL/L (ref 21–32)
CREAT SERPL-MCNC: 0.8 MG/DL (ref 0.6–1.1)
EOSINOPHILS ABSOLUTE: 0.3 K/UL (ref 0–0.6)
EOSINOPHILS RELATIVE PERCENT: 5 %
ESTIMATED AVERAGE GLUCOSE: 128.4 MG/DL
GFR AFRICAN AMERICAN: >60
GFR NON-AFRICAN AMERICAN: >60
GLUCOSE BLD-MCNC: 125 MG/DL (ref 70–99)
HBA1C MFR BLD: 6.1 %
HCT VFR BLD CALC: 43.4 % (ref 36–48)
HDLC SERPL-MCNC: 40 MG/DL (ref 40–60)
HEMOGLOBIN: 14.8 G/DL (ref 12–16)
LDL CHOLESTEROL CALCULATED: 114 MG/DL
LYMPHOCYTES ABSOLUTE: 1.8 K/UL (ref 1–5.1)
LYMPHOCYTES RELATIVE PERCENT: 30.3 %
MCH RBC QN AUTO: 31 PG (ref 26–34)
MCHC RBC AUTO-ENTMCNC: 34.2 G/DL (ref 31–36)
MCV RBC AUTO: 90.8 FL (ref 80–100)
MONOCYTES ABSOLUTE: 0.3 K/UL (ref 0–1.3)
MONOCYTES RELATIVE PERCENT: 5.3 %
NEUTROPHILS ABSOLUTE: 3.4 K/UL (ref 1.7–7.7)
NEUTROPHILS RELATIVE PERCENT: 58.7 %
PDW BLD-RTO: 13.3 % (ref 12.4–15.4)
PLATELET # BLD: 353 K/UL (ref 135–450)
PMV BLD AUTO: 6.5 FL (ref 5–10.5)
POTASSIUM SERPL-SCNC: 4.4 MMOL/L (ref 3.5–5.1)
RBC # BLD: 4.78 M/UL (ref 4–5.2)
SODIUM BLD-SCNC: 137 MMOL/L (ref 136–145)
TOTAL PROTEIN: 7.4 G/DL (ref 6.4–8.2)
TRIGL SERPL-MCNC: 156 MG/DL (ref 0–150)
VLDLC SERPL CALC-MCNC: 31 MG/DL
WBC # BLD: 5.8 K/UL (ref 4–11)

## 2022-07-07 NOTE — RESULT ENCOUNTER NOTE
CALL -[id number]  to add test to existing blood collected recently and then FAX printed order to lab: [phone]

## 2022-11-28 ENCOUNTER — OFFICE VISIT (OUTPATIENT)
Dept: INTERNAL MEDICINE CLINIC | Age: 47
End: 2022-11-28
Payer: COMMERCIAL

## 2022-11-28 VITALS
WEIGHT: 160 LBS | SYSTOLIC BLOOD PRESSURE: 120 MMHG | HEART RATE: 84 BPM | DIASTOLIC BLOOD PRESSURE: 66 MMHG | OXYGEN SATURATION: 96 % | HEIGHT: 65 IN | BODY MASS INDEX: 26.66 KG/M2

## 2022-11-28 DIAGNOSIS — J06.9 VIRAL UPPER RESPIRATORY INFECTION: ICD-10-CM

## 2022-11-28 DIAGNOSIS — J02.9 PHARYNGITIS, UNSPECIFIED ETIOLOGY: Primary | ICD-10-CM

## 2022-11-28 LAB — S PYO AG THROAT QL: NORMAL

## 2022-11-28 PROCEDURE — 99213 OFFICE O/P EST LOW 20 MIN: CPT | Performed by: INTERNAL MEDICINE

## 2022-11-28 PROCEDURE — 87880 STREP A ASSAY W/OPTIC: CPT | Performed by: INTERNAL MEDICINE

## 2022-11-28 NOTE — PROGRESS NOTES
Jenni Hdz  YOB: 1975    Date of Service:  11/28/2022    Chief Complaint:      Chief Complaint   Patient presents with    Cough     Got COVID booster yesterday- x 3 days     Pharyngitis     X 1 day    Otalgia     Bilateral x 1 day            Assessment/Plan:  Carmen Perez was seen today for cough, pharyngitis and otalgia. Diagnoses and all orders for this visit:    Pharyngitis, unspecified etiology  -     POCT rapid strep A    Viral upper respiratory infection    Stable and continue on current medications. Return if symptoms worsen or fail to improve. HPI:  Jenni Hdz is a 49 y.o. She complain of dry cough that started yesterday and then sore throat later on. 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 She was tested negative for Covid. No fever or chills. No runny nose or face pressure. Her son had similar symptoms and was negative for influenza and strep at his doctor's office.     Lab Results   Component Value Date    LABA1C 6.1 07/06/2022     Lab Results   Component Value Date     07/06/2022    K 4.4 07/06/2022     07/06/2022    CO2 20 (L) 07/06/2022    BUN 10 07/06/2022    CREATININE 0.8 07/06/2022    GLUCOSE 125 (H) 07/06/2022    CALCIUM 9.6 07/06/2022     Lab Results   Component Value Date/Time    CHOL 185 07/06/2022 10:45 AM    TRIG 156 07/06/2022 10:45 AM    HDL 40 07/06/2022 10:45 AM    LDLCALC 114 07/06/2022 10:45 AM     Lab Results   Component Value Date    ALT 32 07/06/2022    AST 26 07/06/2022     No results found for: TSH, T4FREE, T3FREE  Lab Results   Component Value Date    WBC 5.8 07/06/2022    HGB 14.8 07/06/2022    HCT 43.4 07/06/2022    MCV 90.8 07/06/2022     07/06/2022     No results found for: INR   No results found for: PSA   No results found for: [facility]     Patient Active Problem List   Diagnosis   (none) - all problems resolved or deleted       No Known Allergies  Outpatient Medications Marked as Taking for the 11/28/22 encounter (Office Visit) with Shavon Cano MD   Medication Sig Dispense Refill    Multiple Vitamin (MULTIVITAMIN) TABS 1 po qd 1 tablet 0    levocetirizine (XYZAL) 5 MG tablet Take 1 tablet by mouth nightly 90 tablet 3    TRIVORA, 28, per tablet            Review of Systems: 14 systems were negative except of what was stated on HPI    Nursing note and vitals reviewed. Vitals:    11/28/22 1515   BP: 120/66   Pulse: 84   SpO2: 96%   Weight: 160 lb (72.6 kg)   Height: 5' 5\" (1.651 m)     Wt Readings from Last 3 Encounters:   11/28/22 160 lb (72.6 kg)   07/06/22 160 lb 12.8 oz (72.9 kg)   06/01/20 164 lb (74.4 kg)     BP Readings from Last 3 Encounters:   11/28/22 120/66   07/06/22 102/70   06/01/20 108/74     Body mass index is 26.63 kg/m². Constitutional: Patient appears well-developed and well-nourished. No distress. Head: Normocephalic and atraumatic. Neck: Normal range of motion. Neck supple. No thyroidmegaly. Cardiovascular: Normal rate, regular rhythm, normal heart sounds and intact distal pulses. Pulmonary/Chest: Effort normal and breath sounds normal. No stridor. No respiratory distress. No wheezes and no rales. Abdominal: Soft. Bowel sounds are normal. No distension and no mass. No tenderness. No rebound and no guarding. Musculoskeletal: No edema and no tenderness. Skin: No rash or erythema.

## 2023-04-24 ENCOUNTER — TELEMEDICINE (OUTPATIENT)
Dept: INTERNAL MEDICINE CLINIC | Age: 48
End: 2023-04-24
Payer: COMMERCIAL

## 2023-04-24 DIAGNOSIS — J06.9 VIRAL URI: Primary | ICD-10-CM

## 2023-04-24 PROCEDURE — 99213 OFFICE O/P EST LOW 20 MIN: CPT | Performed by: INTERNAL MEDICINE

## 2023-04-24 NOTE — PROGRESS NOTES
Pursuant to the emergency declaration under the [facility], Count includes the [facility] waiver authority and the [employer] Act, this Virtual  Video Visit was conducted, with patient's consent, to reduce the patient's risk of exposure to COVID-19 and provide continuity of care. Service is  provided through a video synchronous discussion virtually to substitute for in-person clinic visit with the patient being at home and Dr. Minesh Isabel being at home. Patient consent to the video visit. Date of Service:  4/24/2023    Chief Complaint:      Chief Complaint   Patient presents with    Congestion     And drainage    Sinusitis     Onset a week ago    Cough     Onset today       Assessment/Plan:    Colten Zapata was seen today for congestion, sinusitis and cough. Diagnoses and all orders for this visit:    Viral URI    Stable and continue on current medications. Return if symptoms worsen or fail to improve. HPI:  Carlton Khan is a 51 y.o. She complain of a productive cough with clear phlegm for about a week. No pain in face or forehead. No fever or chills. Son is getting over a sinus infection. She's been taking a generic mucinex.       Lab Results   Component Value Date    LABA1C 6.1 07/06/2022     Lab Results   Component Value Date     07/06/2022    K 4.4 07/06/2022     07/06/2022    CO2 20 (L) 07/06/2022    BUN 10 07/06/2022    CREATININE 0.8 07/06/2022    GLUCOSE 125 (H) 07/06/2022    CALCIUM 9.6 07/06/2022     Lab Results   Component Value Date/Time    CHOL 185 07/06/2022 10:45 AM    TRIG 156 07/06/2022 10:45 AM    HDL 40 07/06/2022 10:45 AM    LDLCALC 114 07/06/2022 10:45 AM     Lab Results   Component Value Date    ALT 32 07/06/2022    AST 26 07/06/2022     No results found for: TSH, T4FREE, T3FREE  Lab Results   Component Value Date    WBC 5.8 07/06/2022    HGB 14.8 07/06/2022    HCT 43.4 07/06/2022    MCV 90.8 07/06/2022

## 2023-07-05 ASSESSMENT — PATIENT HEALTH QUESTIONNAIRE - PHQ9
2. FEELING DOWN, DEPRESSED OR HOPELESS: 0
SUM OF ALL RESPONSES TO PHQ QUESTIONS 1-9: 0
SUM OF ALL RESPONSES TO PHQ QUESTIONS 1-9: 0
SUM OF ALL RESPONSES TO PHQ9 QUESTIONS 1 & 2: 0
1. LITTLE INTEREST OR PLEASURE IN DOING THINGS: NOT AT ALL
2. FEELING DOWN, DEPRESSED OR HOPELESS: NOT AT ALL
1. LITTLE INTEREST OR PLEASURE IN DOING THINGS: 0
SUM OF ALL RESPONSES TO PHQ9 QUESTIONS 1 & 2: 0
SUM OF ALL RESPONSES TO PHQ QUESTIONS 1-9: 0
SUM OF ALL RESPONSES TO PHQ QUESTIONS 1-9: 0

## 2023-07-06 ENCOUNTER — OFFICE VISIT (OUTPATIENT)
Dept: INTERNAL MEDICINE CLINIC | Age: 48
End: 2023-07-06
Payer: COMMERCIAL

## 2023-07-06 VITALS
SYSTOLIC BLOOD PRESSURE: 114 MMHG | HEART RATE: 71 BPM | DIASTOLIC BLOOD PRESSURE: 64 MMHG | HEIGHT: 65 IN | OXYGEN SATURATION: 95 % | WEIGHT: 164 LBS | BODY MASS INDEX: 27.32 KG/M2

## 2023-07-06 DIAGNOSIS — Z12.11 SCREEN FOR COLON CANCER: ICD-10-CM

## 2023-07-06 DIAGNOSIS — Z00.00 ENCOUNTER FOR WELL ADULT EXAM WITHOUT ABNORMAL FINDINGS: Primary | ICD-10-CM

## 2023-07-06 DIAGNOSIS — Z12.39 ENCOUNTER FOR SCREENING FOR MALIGNANT NEOPLASM OF BREAST, UNSPECIFIED SCREENING MODALITY: ICD-10-CM

## 2023-07-06 PROCEDURE — 36415 COLL VENOUS BLD VENIPUNCTURE: CPT | Performed by: INTERNAL MEDICINE

## 2023-07-06 PROCEDURE — 99396 PREV VISIT EST AGE 40-64: CPT | Performed by: INTERNAL MEDICINE

## 2023-07-06 SDOH — ECONOMIC STABILITY: FOOD INSECURITY: WITHIN THE PAST 12 MONTHS, THE FOOD YOU BOUGHT JUST DIDN'T LAST AND YOU DIDN'T HAVE MONEY TO GET MORE.: NEVER TRUE

## 2023-07-06 SDOH — ECONOMIC STABILITY: FOOD INSECURITY: WITHIN THE PAST 12 MONTHS, YOU WORRIED THAT YOUR FOOD WOULD RUN OUT BEFORE YOU GOT MONEY TO BUY MORE.: NEVER TRUE

## 2023-07-06 SDOH — ECONOMIC STABILITY: INCOME INSECURITY: HOW HARD IS IT FOR YOU TO PAY FOR THE VERY BASICS LIKE FOOD, HOUSING, MEDICAL CARE, AND HEATING?: NOT HARD AT ALL

## 2023-07-06 SDOH — ECONOMIC STABILITY: HOUSING INSECURITY
IN THE LAST 12 MONTHS, WAS THERE A TIME WHEN YOU DID NOT HAVE A STEADY PLACE TO SLEEP OR SLEPT IN A SHELTER (INCLUDING NOW)?: NO

## 2023-07-06 NOTE — PROGRESS NOTES
[facility] Primary Care  History and Physical  Alex BANDAR Camp Pat Seals  YOB: 1975    Date of Service:  7/6/2023    Chief Complaint:   Pat Seals is a 51 y.o. female who presents for   Chief Complaint   Patient presents with    Annual Exam       Assessment/Plan:    Dipak Bland was seen today for annual exam.    Diagnoses and all orders for this visit:    Encounter for well adult exam without abnormal findings  -     Lipid Panel  -     CBC with Auto Differential  -     Comprehensive Metabolic Panel    Screen for colon cancer  -     [facility] - Denis Galan MD, Gastroenterology, [facility]    Encounter for screening for malignant neoplasm of breast, unspecified screening modality  -     MONSERRAT DIGITAL SCREENING AUGMENTED BILATERAL; Future      Return Fasting PE 7/10. HPI: Here for Annual Physical and Follow up.       Lab Results   Component Value Date    LABA1C 6.1 07/06/2022     Lab Results   Component Value Date     07/06/2022    K 4.4 07/06/2022     07/06/2022    CO2 20 (L) 07/06/2022    BUN 10 07/06/2022    CREATININE 0.8 07/06/2022    GLUCOSE 125 (H) 07/06/2022    CALCIUM 9.6 07/06/2022     Lab Results   Component Value Date/Time    CHOL 185 07/06/2022 10:45 AM    TRIG 156 07/06/2022 10:45 AM    HDL 40 07/06/2022 10:45 AM    LDLCALC 114 07/06/2022 10:45 AM     Lab Results   Component Value Date    ALT 32 07/06/2022    AST 26 07/06/2022     No results found for: TSH, T4FREE, T3FREE  Lab Results   Component Value Date    WBC 5.8 07/06/2022    HGB 14.8 07/06/2022    HCT 43.4 07/06/2022    MCV 90.8 07/06/2022     07/06/2022     No results found for: INR   No results found for: PSA   No results found for: LABURIC     Wt Readings from Last 3 Encounters:   07/06/23 164 lb (74.4 kg)   11/28/22 160 lb (72.6 kg)   07/06/22 160 lb 12.8 oz (72.9 kg)     BP Readings from Last 3 Encounters:   07/06/23 114/64   11/28/22 120/66   07/06/22 102/70       Patient Active

## 2023-07-07 LAB
ALBUMIN SERPL-MCNC: 4.5 G/DL (ref 3.4–5)
ALBUMIN/GLOB SERPL: 1.6 {RATIO} (ref 1.1–2.2)
ALP SERPL-CCNC: 45 U/L (ref 40–129)
ALT SERPL-CCNC: 25 U/L (ref 10–40)
ANION GAP SERPL CALCULATED.3IONS-SCNC: 12 MMOL/L (ref 3–16)
AST SERPL-CCNC: 24 U/L (ref 15–37)
BASOPHILS # BLD: 0 K/UL (ref 0–0.2)
BASOPHILS NFR BLD: 0.5 %
BILIRUB SERPL-MCNC: 0.3 MG/DL (ref 0–1)
BUN SERPL-MCNC: 13 MG/DL (ref 7–20)
CALCIUM SERPL-MCNC: 10.1 MG/DL (ref 8.3–10.6)
CHLORIDE SERPL-SCNC: 101 MMOL/L (ref 99–110)
CHOLEST SERPL-MCNC: 194 MG/DL (ref 0–199)
CO2 SERPL-SCNC: 26 MMOL/L (ref 21–32)
CREAT SERPL-MCNC: 0.8 MG/DL (ref 0.6–1.1)
DEPRECATED RDW RBC AUTO: 12.9 % (ref 12.4–15.4)
EOSINOPHIL # BLD: 0.2 K/UL (ref 0–0.6)
EOSINOPHIL NFR BLD: 4.2 %
GFR SERPLBLD CREATININE-BSD FMLA CKD-EPI: >60 ML/MIN/{1.73_M2}
GLUCOSE SERPL-MCNC: 121 MG/DL (ref 70–99)
HCT VFR BLD AUTO: 42 % (ref 36–48)
HDLC SERPL-MCNC: 40 MG/DL (ref 40–60)
HGB BLD-MCNC: 14.5 G/DL (ref 12–16)
LDLC SERPL CALC-MCNC: 131 MG/DL
LYMPHOCYTES # BLD: 1.7 K/UL (ref 1–5.1)
LYMPHOCYTES NFR BLD: 36.7 %
MCH RBC QN AUTO: 32.3 PG (ref 26–34)
MCHC RBC AUTO-ENTMCNC: 34.5 G/DL (ref 31–36)
MCV RBC AUTO: 93.8 FL (ref 80–100)
MONOCYTES # BLD: 0.3 K/UL (ref 0–1.3)
MONOCYTES NFR BLD: 5.7 %
NEUTROPHILS # BLD: 2.5 K/UL (ref 1.7–7.7)
NEUTROPHILS NFR BLD: 52.9 %
PLATELET # BLD AUTO: 360 K/UL (ref 135–450)
PMV BLD AUTO: 6.6 FL (ref 5–10.5)
POTASSIUM SERPL-SCNC: 4.7 MMOL/L (ref 3.5–5.1)
PROT SERPL-MCNC: 7.4 G/DL (ref 6.4–8.2)
RBC # BLD AUTO: 4.48 M/UL (ref 4–5.2)
SODIUM SERPL-SCNC: 139 MMOL/L (ref 136–145)
TRIGL SERPL-MCNC: 116 MG/DL (ref 0–150)
VLDLC SERPL CALC-MCNC: 23 MG/DL
WBC # BLD AUTO: 4.7 K/UL (ref 4–11)

## 2023-08-09 NOTE — PROGRESS NOTES
Heidi Juan    Age 51 y.o.    female    1975    MRN [id number]    8/15/2023  Arrival Time_____________  OR Time____________30 Angel[address]al     Procedure(s):  COLONOSCOPY                      General    Surgeon(s):  Priyank Layer, MD       Phone [phone] (home)     [place]  Cell         Work  _____________________________________________________________________  _____________________________________________________________________  _____________________________________________________________________  _____________________________________________________________________  _____________________________________________________________________    PCP _____________________________ Phone_________________     H&P__________________Bringing      Chart            Epic   DOS      Called________  EKG__________________Bringing      Chart            Epic   DOS      Called________  LAB__________________ Bringing      Chart            Epic   DOS      Called________  Cardiac Clearance_______Bringing      Chart            Epic      DOS      Called________    Cardiologist________________________ Phone___________________________    ? [facility] concerns / Waiver on Chart            [employer]________________  ? Pre-op Instructions Given [address]          _________________________________  ? Directions to Surgery Center                          _________________________________  ? Transportation Home_______________      __________________________________  ?  Crutches/Walker__________________        __________________________________    ________Pre-op Orders   _______Transcribed    _______Wt.  ________Pharmacy          _______SCD  ______VTE     ______TED Deann Jeannie  _______  Surgery Consent    _______  Anesthesia Consent         COVID DATE______________LOCATION________________ RESULT__________

## 2023-08-14 ENCOUNTER — ANESTHESIA EVENT (OUTPATIENT)
Dept: ENDOSCOPY | Age: 48
End: 2023-08-14
Payer: COMMERCIAL

## 2023-08-14 NOTE — PROGRESS NOTES
Date and time of surgery :   8/15/23 at 1           Arrival Time:  730     Bring Picture ID and insurance card. Please wear simple, loose fitting clothing to the hospital.   Manisha Recinos not bring valuables (money, credit cards, checkbooks, etc.)   Do not wear any makeup (including  eye makeup) and no nail polish or artificial nails on your fingers or toes. DO NOT wear any jewelry or piercings on day of surgery. All body piercing jewelry must be removed. If you have dentures, they will be removed before going to the OR; we will provide you a container. If you wear contact lenses or glasses, they will be removed; please bring a case for them. Shower the evening before or morning of surgery with antibacterial soap. Nothing to eat or drink after 330 am the morning of your procedure  You may brush your teeth and gargle the morning of surgery. DO NOT SWALLOW WATER. Do not take any morning meds the day of your surgery. Aspirin, Ibuprofen, Advil, Naproxen, Vitamin E and other Anti-inflammatory products and supplements should be stopped for 5 -7days before surgery or as directed by your physician. Do not smoke or drink any alcoholic beverages 24 hours prior to surgery. This includes NA Beer. Refrain from the usage of any recreational drugs, including non-prescribed prescription drugs. You MUST plan for a responsible adult to stay on site while you are here and take you home after your surgery. You will not be allowed to leave alone or drive yourself home. It is strongly suggested someone stay with you the first 24 hrs. Your surgery will be cancelled if you do not have a ride home. To help prevent infection, change your sheets the night before surgery. If you  have a Living Will and Durable Power of  for Healthcare, please bring in a copy. Notify your Surgeon if you develop any illness between now and time of surgery.  Cough, cold, fever, sore throat, nausea, vomiting, etc.  Please notify your surgeon if you experience dizziness, shortness of breath or blurred vision between now & the time of your surgery  To provide excellent care visitors will be limited to two per room at any given time. No visitors under the age of 15. If you use oxygen and have a portable tank please bring it with you the DOS  For your convenience [facility] has a pharmacy on site to fill your prescriptions. *Please call pre admission testing if you have any further questions             Bob Hdz      [phone]                            Address: [address] [place] [address]     When you pull into the hospital and are looking at the main hospital entrance, turn right. We are a tan building to the right of the main entrance. [address] over the door.   .                                                        Revision History

## 2023-08-15 ENCOUNTER — ANESTHESIA (OUTPATIENT)
Dept: ENDOSCOPY | Age: 48
End: 2023-08-15
Payer: COMMERCIAL

## 2023-08-15 ENCOUNTER — HOSPITAL ENCOUNTER (OUTPATIENT)
Age: 48
Setting detail: OUTPATIENT SURGERY
Discharge: HOME | End: 2023-08-15
Attending: INTERNAL MEDICINE | Admitting: INTERNAL MEDICINE
Payer: COMMERCIAL

## 2023-08-15 VITALS
SYSTOLIC BLOOD PRESSURE: 128 MMHG | WEIGHT: 168 LBS | RESPIRATION RATE: 16 BRPM | HEART RATE: 76 BPM | HEIGHT: 65 IN | BODY MASS INDEX: 27.99 KG/M2 | DIASTOLIC BLOOD PRESSURE: 85 MMHG | TEMPERATURE: 97.2 F | OXYGEN SATURATION: 96 %

## 2023-08-15 LAB — HCG UR QL: NEGATIVE

## 2023-08-15 PROCEDURE — 3609027000 HC COLONOSCOPY: Performed by: INTERNAL MEDICINE

## 2023-08-15 PROCEDURE — 2709999900 HC NON-CHARGEABLE SUPPLY: Performed by: INTERNAL MEDICINE

## 2023-08-15 PROCEDURE — 2580000003 HC RX 258: Performed by: ANESTHESIOLOGY

## 2023-08-15 PROCEDURE — 7100000010 HC PHASE II RECOVERY - FIRST 15 MIN: Performed by: INTERNAL MEDICINE

## 2023-08-15 PROCEDURE — 2500000003 HC RX 250 WO HCPCS: Performed by: NURSE ANESTHETIST, CERTIFIED REGISTERED

## 2023-08-15 PROCEDURE — 3700000000 HC ANESTHESIA ATTENDED CARE: Performed by: INTERNAL MEDICINE

## 2023-08-15 PROCEDURE — 3700000001 HC ADD 15 MINUTES (ANESTHESIA): Performed by: INTERNAL MEDICINE

## 2023-08-15 PROCEDURE — 7100000011 HC PHASE II RECOVERY - ADDTL 15 MIN: Performed by: INTERNAL MEDICINE

## 2023-08-15 PROCEDURE — 6360000002 HC RX W HCPCS: Performed by: NURSE ANESTHETIST, CERTIFIED REGISTERED

## 2023-08-15 PROCEDURE — 84703 CHORIONIC GONADOTROPIN ASSAY: CPT

## 2023-08-15 RX ORDER — LIDOCAINE HYDROCHLORIDE 10 MG/ML
0.3 INJECTION, SOLUTION EPIDURAL; INFILTRATION; INTRACAUDAL; PERINEURAL
Status: DISCONTINUED | OUTPATIENT
Start: 2023-08-15 | End: 2023-08-15 | Stop reason: HOSPADM

## 2023-08-15 RX ORDER — PROPOFOL 10 MG/ML
INJECTION, EMULSION INTRAVENOUS CONTINUOUS PRN
Status: DISCONTINUED | OUTPATIENT
Start: 2023-08-15 | End: 2023-08-15 | Stop reason: SDUPTHER

## 2023-08-15 RX ORDER — SODIUM CHLORIDE 9 MG/ML
INJECTION, SOLUTION INTRAVENOUS PRN
Status: DISCONTINUED | OUTPATIENT
Start: 2023-08-15 | End: 2023-08-15 | Stop reason: HOSPADM

## 2023-08-15 RX ORDER — SODIUM CHLORIDE, SODIUM LACTATE, POTASSIUM CHLORIDE, CALCIUM CHLORIDE 600; 310; 30; 20 MG/100ML; MG/100ML; MG/100ML; MG/100ML
INJECTION, SOLUTION INTRAVENOUS CONTINUOUS
Status: DISCONTINUED | OUTPATIENT
Start: 2023-08-15 | End: 2023-08-15 | Stop reason: HOSPADM

## 2023-08-15 RX ORDER — SODIUM CHLORIDE 0.9 % (FLUSH) 0.9 %
5-40 SYRINGE (ML) INJECTION PRN
Status: DISCONTINUED | OUTPATIENT
Start: 2023-08-15 | End: 2023-08-15 | Stop reason: HOSPADM

## 2023-08-15 RX ORDER — LIDOCAINE HYDROCHLORIDE 20 MG/ML
INJECTION, SOLUTION INFILTRATION; PERINEURAL PRN
Status: DISCONTINUED | OUTPATIENT
Start: 2023-08-15 | End: 2023-08-15 | Stop reason: SDUPTHER

## 2023-08-15 RX ORDER — LABETALOL HYDROCHLORIDE 5 MG/ML
INJECTION, SOLUTION INTRAVENOUS PRN
Status: DISCONTINUED | OUTPATIENT
Start: 2023-08-15 | End: 2023-08-15 | Stop reason: SDUPTHER

## 2023-08-15 RX ORDER — PROPOFOL 10 MG/ML
INJECTION, EMULSION INTRAVENOUS PRN
Status: DISCONTINUED | OUTPATIENT
Start: 2023-08-15 | End: 2023-08-15 | Stop reason: SDUPTHER

## 2023-08-15 RX ORDER — SODIUM CHLORIDE 0.9 % (FLUSH) 0.9 %
5-40 SYRINGE (ML) INJECTION EVERY 12 HOURS SCHEDULED
Status: DISCONTINUED | OUTPATIENT
Start: 2023-08-15 | End: 2023-08-15 | Stop reason: HOSPADM

## 2023-08-15 RX ADMIN — PROPOFOL 120 MCG/KG/MIN: 10 INJECTION, EMULSION INTRAVENOUS at 08:43

## 2023-08-15 RX ADMIN — PROPOFOL 10 MG: 10 INJECTION, EMULSION INTRAVENOUS at 08:12

## 2023-08-15 RX ADMIN — SODIUM CHLORIDE, POTASSIUM CHLORIDE, SODIUM LACTATE AND CALCIUM CHLORIDE: 600; 310; 30; 20 INJECTION, SOLUTION INTRAVENOUS at 08:07

## 2023-08-15 RX ADMIN — LABETALOL HYDROCHLORIDE 2.5 MG: 5 INJECTION, SOLUTION INTRAVENOUS at 08:56

## 2023-08-15 RX ADMIN — PROPOFOL 10 MG: 10 INJECTION, EMULSION INTRAVENOUS at 08:50

## 2023-08-15 RX ADMIN — PROPOFOL 100 MG: 10 INJECTION, EMULSION INTRAVENOUS at 08:43

## 2023-08-15 RX ADMIN — LIDOCAINE HYDROCHLORIDE 60 MG: 20 INJECTION, SOLUTION INFILTRATION; PERINEURAL at 08:43

## 2023-08-15 ASSESSMENT — PAIN SCALES - GENERAL: PAINLEVEL_OUTOF10: 0

## 2023-08-15 NOTE — PROCEDURES
Colonoscopy Procedure  Note          Patient: Zee Pelayo  : 1975  CSN:     Procedure: Colonoscopy     Date:  8/15/2023    Surgeon:  Ethan Langley MD, MD    Referring Provider:  Thelma Cano    Preoperative Diagnosis:  Screening for colon cancer    Postoperative Diagnosis:  Normal colonoscopy, diverticuli, hemorrhoids    Anesthesia:  Propofol    EBL: <5 mL    Indications: This is a 51y.o. year old female who presents today with screening for colon cancer. Procedure: An informed consent was obtained from the patient after explanation of indications, benefits, possible risks and complications of the procedure. The patient was then taken to the endoscopy suite, placed in the left lateral decubitus position, and the above IV anesthesia was administered. With the patient in left lateral decubitus position the endoscope was inserted through the anorectal area into the rectum. The scope was then advanced through the length of the colon to the ileum. The ileocecal valve was visualized and cannulated. The quality of preparation was good. Water was insufflated through the biopsy channel to clean out the colon and look at the underlying mucosa. The scope was carefully withdrawn and found to be normal.    Digital rectal examination was performed, no abnormalities noted. The scope was advanced all the way to the cecum, the mucosa appeared normal.  Appendix was identified. The terminal ileum was briefly intubated, the mucosa appeared normal.  The mucosa in the ascending, transverse, descending, sigmoid and rectum appeared normal.  Scattered diverticuli were seen. On retroflexion hemorrhoids were noted. The scope was straightened, the colon was decompressed and the scope was withdrawn from the patient. The patient tolerated the procedure well and was taken to the PACU in good condition. Impression:  Diverticuli, hemorrhoids    Recommendations:  Repeat colonoscopy in 10 years.     [employer]

## 2023-08-15 NOTE — ANESTHESIA POSTPROCEDURE EVALUATION
Department of Anesthesiology  Postprocedure Note    Patient: Odilia Adhikari  MRN: [id number]  YOB: 1975  Date of evaluation: 8/15/2023      Procedure Summary     Date: 08/15/23 Room / Location: [address] [place] [address] 01 / [facility]    Anesthesia Start: 4404 Anesthesia Stop: 6254    Procedure: COLONOSCOPY Diagnosis:       Screening for colon cancer      (Screening for colon cancer [Z12.11])    Surgeons: Jessica Moon MD Responsible Provider: Javad Shaffer MD    Anesthesia Type: MAC ASA Status: 2          Anesthesia Type: No value filed.     Zaire Phase I: Zaire Score: 10    Zaire Phase II: Zaire Score: 10      Anesthesia Post Evaluation    Patient location during evaluation: PACU  Patient participation: complete - patient participated  Level of consciousness: awake and alert  Pain score: 0  Airway patency: patent  Nausea & Vomiting: no nausea and no vomiting  Complications: no  Cardiovascular status: blood pressure returned to baseline  Respiratory status: acceptable  Hydration status: stable  Pain management: adequate

## 2023-08-15 NOTE — H&P
Gastroenterology Note             Pre-operative History and Physical    Patient: Barry Reeves  : 1975  CSN:     History Obtained From:  patient and/or guardian. HISTORY OF PRESENT ILLNESS:    The patient is a 51 y.o. female  here for colonoscopy. Past Medical History:    Past Medical History:   Diagnosis Date    Gestational diabetes      Past Surgical History:    Past Surgical History:   Procedure Laterality Date     SECTION  2007, 2009, 2014    DILATION AND CURETTAGE OF UTERUS  , 2012    WISDOM TOOTH EXTRACTION       Medications Prior to Admission:   No current facility-administered medications on file prior to encounter. Current Outpatient Medications on File Prior to Encounter   Medication Sig Dispense Refill    FEXOFENADINE HCL PO Take by mouth daily      Multiple Vitamin (MULTIVITAMIN) TABS 1 po qd 1 tablet 0    levocetirizine (XYZAL) 5 MG tablet Take 1 tablet by mouth nightly 90 tablet 3    TRIVORA, 28, per tablet daily          Allergies:  Patient has no known allergies. Social History:   Social History     Tobacco Use    Smoking status: Former     Types: Cigarettes     Quit date: 1998     Years since quittin.2    Smokeless tobacco: Never    Tobacco comments:      In college- smoked x 1 year   Substance Use Topics    Alcohol use: Not Currently     Comment: 0-1 drinks per month     Family History:   Family History   Problem Relation Age of Onset    High Blood Pressure Father     High Cholesterol Father     Heart Disease Father     Dementia Father     Diabetes Maternal Grandmother     Stroke Maternal Grandmother     Depression Mother     Anxiety Disorder Mother     Diabetes Mother     High Blood Pressure Brother     Heart Disease Brother         stents    Cancer Paternal Aunt         breast    Heart Disease Paternal Uncle     Cancer Paternal Uncle         leukemia    Depression Brother     Anxiety Disorder Brother     Migraines Brother

## 2023-08-15 NOTE — DISCHARGE INSTRUCTIONS
follow up phone call to ask about your care. Colonoscopy: What to Expect at [address]  After you have a colonoscopy, you will stay at the clinic for 1 to 2 hours until the medicines wear off. Then you can go home. But you will need to arrange for a ride. Your doctor will tell you when you can eat and do your other usual activities. Your doctor will talk to you about when you will need your next colonoscopy. Your doctor can help you decide how often you need to be checked. This will depend on the results of your test and your risk for colorectal cancer. After the test, you may be bloated or have gas pains. You may need to pass gas. If a biopsy was done or a polyp was removed, you may have streaks of blood in your stool (feces) for a few days. Problems such as heavy rectal bleeding may not occur until several weeks after the test. This isn't common. But it can happen after polyps are removed. This care sheet gives you a general idea about how long it will take for you to recover. But each person recovers at a different pace. Follow the steps below to get better as quickly as possible. How can you care for yourself at home? Activity    Rest when you feel tired. You can do your normal activities when it feels okay to do so. Diet    Follow your doctor's directions for eating. Unless your doctor has told you not to, drink plenty of fluids. This helps to replace the fluids that were lost during the colon prep. Do not drink alcohol. Medicines    Your doctor will tell you if and when you can restart your medicines. He or she will also give you instructions about taking any new medicines. If you take blood thinners, such as warfarin (Coumadin), clopidogrel (Plavix), or aspirin, be sure to talk to your doctor. He or she will tell you if and when to start taking those medicines again. Make sure that you understand exactly what your doctor wants you to do.      If polyps were removed or you have questions about a medical condition or this instruction, always ask your healthcare professional. [address] any warranty or liability for your use of this information.

## 2023-08-16 DIAGNOSIS — J30.89 ENVIRONMENTAL AND SEASONAL ALLERGIES: ICD-10-CM

## 2023-08-16 RX ORDER — LEVOCETIRIZINE DIHYDROCHLORIDE 5 MG/1
5 TABLET, FILM COATED ORAL NIGHTLY
Qty: 90 TABLET | Refills: 3 | Status: SHIPPED | OUTPATIENT
Start: 2023-08-16

## 2024-11-05 ENCOUNTER — HOSPITAL ENCOUNTER (OUTPATIENT)
Dept: MAMMOGRAPHY | Age: 49
Discharge: HOME | End: 2024-11-10
Payer: COMMERCIAL

## 2024-11-05 VITALS — WEIGHT: 165 LBS | HEIGHT: 65 IN | BODY MASS INDEX: 27.49 KG/M2

## 2024-11-05 DIAGNOSIS — Z12.31 VISIT FOR SCREENING MAMMOGRAM: ICD-10-CM

## 2024-11-05 PROCEDURE — 77063 BREAST TOMOSYNTHESIS BI: CPT

## 2025-03-04 ENCOUNTER — TELEPHONE (OUTPATIENT)
Dept: INTERNAL MEDICINE CLINIC | Age: 50
End: 2025-03-04

## 2025-03-04 ENCOUNTER — TELEMEDICINE (OUTPATIENT)
Dept: INTERNAL MEDICINE CLINIC | Age: 50
End: 2025-03-04

## 2025-03-04 DIAGNOSIS — J10.1 INFLUENZA A: Primary | ICD-10-CM

## 2025-03-04 LAB
INFLUENZA A ANTIBODY: POSITIVE
INFLUENZA B ANTIBODY: NEGATIVE

## 2025-03-04 RX ORDER — OSELTAMIVIR PHOSPHATE 75 MG/1
75 CAPSULE ORAL 2 TIMES DAILY
Qty: 10 CAPSULE | Refills: 0 | Status: SHIPPED | OUTPATIENT
Start: 2025-03-04 | End: 2025-03-09

## 2025-03-04 SDOH — ECONOMIC STABILITY: FOOD INSECURITY: WITHIN THE PAST 12 MONTHS, YOU WORRIED THAT YOUR FOOD WOULD RUN OUT BEFORE YOU GOT MONEY TO BUY MORE.: NEVER TRUE

## 2025-03-04 SDOH — ECONOMIC STABILITY: FOOD INSECURITY: WITHIN THE PAST 12 MONTHS, THE FOOD YOU BOUGHT JUST DIDN'T LAST AND YOU DIDN'T HAVE MONEY TO GET MORE.: NEVER TRUE

## 2025-03-04 ASSESSMENT — PATIENT HEALTH QUESTIONNAIRE - PHQ9
SUM OF ALL RESPONSES TO PHQ QUESTIONS 1-9: 0
SUM OF ALL RESPONSES TO PHQ QUESTIONS 1-9: 0
2. FEELING DOWN, DEPRESSED OR HOPELESS: NOT AT ALL
SUM OF ALL RESPONSES TO PHQ QUESTIONS 1-9: 0
1. LITTLE INTEREST OR PLEASURE IN DOING THINGS: NOT AT ALL
SUM OF ALL RESPONSES TO PHQ QUESTIONS 1-9: 0

## 2025-03-04 NOTE — TELEPHONE ENCOUNTER
Patient calling for an appointment today for sinus pain/congestion, sore throat, both ears hurting.Sx's onset yesterday  She would be available for a VV today from 1:00-1:30 if appropriate.  No Covid or flu test done, last dos- 7-6-2023  Will check with , if ok will schedule

## 2025-03-04 NOTE — PROGRESS NOTES
Patient was evaluated through a synchronous (real-time) video encounter. Patient identification was verified at the start of the visit. She (or guardian if applicable) is aware that this is a billable service, which includes applicable co-pays. This visit was conducted with the patient's (and/or legal guardian's) verbal consent. She has not had a related appointment within my department in the past 7 days or scheduled within the next 24 hours.   The patient was located at Home: [address] [place] [address] [place] [address].  The provider was located at Home (Appt Dept State): [place].    Date of Service:  3/4/2025    Chief Complaint:      Chief Complaint   Patient presents with    Pharyngitis    Ear Pain     Bi-lateral    Facial Pain     Onset yesterday       Assessment/Plan:    Evelin was seen today for pharyngitis, ear pain and facial pain.    Diagnoses and all orders for this visit:    Influenza A  -     POCT Influenza A/B  -     oseltamivir (TAMIFLU) 75 MG capsule; Take 1 capsule by mouth 2 times daily for 5 days        Return Fasting PE 3/25.      HPI:  Evelin Aldana is a 50 y.o.      She complain of pain in face, throat, ears and congestion that started yesterday.  Nasal drainage has been clear.  She has a dry cough and some sneezing.  She's been taking mucinex.  Some myalgia and chills.  Have not done a home covid test yet.    Lab Results   Component Value Date    LABA1C 6.1 07/06/2022     Lab Results   Component Value Date     07/06/2023    K 4.7 07/06/2023     07/06/2023    CO2 26 07/06/2023    BUN 13 07/06/2023    CREATININE 0.8 07/06/2023    GLUCOSE 121 (H) 07/06/2023    CALCIUM 10.1 07/06/2023     Lab Results   Component Value Date/Time    CHOL 194 07/06/2023 09:42 AM    TRIG 116 07/06/2023 09:42 AM    HDL 40 07/06/2023 09:42 AM     Lab Results   Component Value Date    ALT 25 07/06/2023    AST 24 07/06/2023     No results found for: \"TSH\", \"T4FREE\", \"T3FREE\"  Lab Results   Component Value Date

## (undated) DEVICE — ELECTRODE,ECG,STRESS,FOAM,3PK: Brand: MEDLINE

## (undated) DEVICE — ENDOSCOPIC KIT 2 12 FT OP4 DE2 GWN SYR

## (undated) DEVICE — CANNULA NSL AD TBNG L7FT PVC STR NONFLARED PRNG O2 DEL W STD